# Patient Record
Sex: MALE | Race: WHITE | Employment: PART TIME | URBAN - METROPOLITAN AREA
[De-identification: names, ages, dates, MRNs, and addresses within clinical notes are randomized per-mention and may not be internally consistent; named-entity substitution may affect disease eponyms.]

---

## 2017-04-08 ENCOUNTER — EMPLOYEE WELLNESS (OUTPATIENT)
Dept: OTHER | Age: 27
End: 2017-04-08

## 2017-04-08 LAB
CHOLESTEROL, TOTAL: 147 MG/DL (ref 0–199)
FASTING: YES
GLUCOSE BLD-MCNC: 83 MG/DL (ref 74–109)
HDLC SERPL-MCNC: 49 MG/DL (ref 40–90)
LDL CHOLESTEROL CALCULATED: 79 MG/DL
TRIGL SERPL-MCNC: 97 MG/DL (ref 0–199)

## 2017-07-06 ENCOUNTER — OFFICE VISIT (OUTPATIENT)
Dept: FAMILY MEDICINE CLINIC | Age: 27
End: 2017-07-06

## 2017-07-06 ENCOUNTER — TELEPHONE (OUTPATIENT)
Dept: FAMILY MEDICINE CLINIC | Age: 27
End: 2017-07-06

## 2017-07-06 VITALS
HEIGHT: 72 IN | BODY MASS INDEX: 24.24 KG/M2 | SYSTOLIC BLOOD PRESSURE: 126 MMHG | DIASTOLIC BLOOD PRESSURE: 72 MMHG | HEART RATE: 88 BPM | RESPIRATION RATE: 12 BRPM | WEIGHT: 179 LBS | TEMPERATURE: 98.3 F

## 2017-07-06 DIAGNOSIS — M54.2 NECK PAIN: ICD-10-CM

## 2017-07-06 DIAGNOSIS — A08.4 VIRAL GASTROENTERITIS: Primary | ICD-10-CM

## 2017-07-06 PROCEDURE — 99213 OFFICE O/P EST LOW 20 MIN: CPT | Performed by: FAMILY MEDICINE

## 2017-07-06 RX ORDER — METOPROLOL SUCCINATE 25 MG/1
37.5 TABLET, EXTENDED RELEASE ORAL DAILY
Qty: 135 TABLET | Refills: 4 | Status: SHIPPED | OUTPATIENT
Start: 2017-07-06 | End: 2018-09-27 | Stop reason: SDUPTHER

## 2017-07-06 RX ORDER — TIZANIDINE 2 MG/1
4 TABLET ORAL EVERY 8 HOURS PRN
Qty: 30 TABLET | Refills: 2 | Status: SHIPPED | OUTPATIENT
Start: 2017-07-06

## 2017-07-06 ASSESSMENT — PATIENT HEALTH QUESTIONNAIRE - PHQ9
SUM OF ALL RESPONSES TO PHQ9 QUESTIONS 1 & 2: 0
2. FEELING DOWN, DEPRESSED OR HOPELESS: 0
1. LITTLE INTEREST OR PLEASURE IN DOING THINGS: 0
SUM OF ALL RESPONSES TO PHQ QUESTIONS 1-9: 0

## 2017-11-27 ENCOUNTER — OFFICE VISIT (OUTPATIENT)
Dept: FAMILY MEDICINE CLINIC | Age: 27
End: 2017-11-27
Payer: COMMERCIAL

## 2017-11-27 VITALS
SYSTOLIC BLOOD PRESSURE: 132 MMHG | TEMPERATURE: 98.3 F | RESPIRATION RATE: 12 BRPM | DIASTOLIC BLOOD PRESSURE: 88 MMHG | WEIGHT: 186.2 LBS | BODY MASS INDEX: 25.22 KG/M2 | HEART RATE: 76 BPM | HEIGHT: 72 IN

## 2017-11-27 DIAGNOSIS — Z23 NEED FOR IMMUNIZATION AGAINST INFLUENZA: ICD-10-CM

## 2017-11-27 DIAGNOSIS — M25.562 ACUTE PAIN OF LEFT KNEE: Primary | ICD-10-CM

## 2017-11-27 PROCEDURE — G8419 CALC BMI OUT NRM PARAM NOF/U: HCPCS | Performed by: FAMILY MEDICINE

## 2017-11-27 PROCEDURE — G8484 FLU IMMUNIZE NO ADMIN: HCPCS | Performed by: FAMILY MEDICINE

## 2017-11-27 PROCEDURE — 90471 IMMUNIZATION ADMIN: CPT | Performed by: FAMILY MEDICINE

## 2017-11-27 PROCEDURE — G8427 DOCREV CUR MEDS BY ELIG CLIN: HCPCS | Performed by: FAMILY MEDICINE

## 2017-11-27 PROCEDURE — 90686 IIV4 VACC NO PRSV 0.5 ML IM: CPT | Performed by: FAMILY MEDICINE

## 2017-11-27 PROCEDURE — 1036F TOBACCO NON-USER: CPT | Performed by: FAMILY MEDICINE

## 2017-11-27 PROCEDURE — 99213 OFFICE O/P EST LOW 20 MIN: CPT | Performed by: FAMILY MEDICINE

## 2017-11-27 NOTE — PROGRESS NOTES
Kate Andrews is a 32 y.o. male that presents for Leg Pain (x 1 week, left leg/knee pain, pt describes that pain as a constant achiness with occassional sharpness, pain radiates into left thigh, leg/knee feels stiff and weak)        HPI:    Patient noting more left knee pain for the past week. No known injury. He has a history of osteochondromas. He has a known one on the left femur. Pain is aching in nature. Worse when he is on his feet more. Aching type pain. Can radiate to the ankle. Health Maintenance   Topic Date Due    HIV screen  02/23/2005    DTaP/Tdap/Td vaccine (2 - Td) 12/27/2026    Flu vaccine  Completed       Past Medical History:   Diagnosis Date    Anxiety     Osteochondroma     Palpitations        Past Surgical History:   Procedure Laterality Date    LEG SURGERY Right     osteochondroma    OTHER SURGICAL HISTORY Left 08/11/2016    Excision left Rib Osteochodnroma by Dr Leeanne Romero EXTRACTION         Social History   Substance Use Topics    Smoking status: Never Smoker    Smokeless tobacco: Never Used    Alcohol use No       Family History   Problem Relation Age of Onset    Cancer Father     High Blood Pressure Father     Diabetes Father     High Cholesterol Mother     High Blood Pressure Brother     Cancer Maternal Grandfather     Heart Disease Neg Hx     Stroke Neg Hx          I have reviewed the patient's past medical history, past surgical history, allergies, medications, social and family history and I have made updates where appropriate.     ROS        PHYSICAL EXAM:  /88   Pulse 76   Temp 98.3 °F (36.8 °C) (Oral)   Resp 12   Ht 6' 0.05\" (1.83 m)   Wt 186 lb 3.2 oz (84.5 kg)   BMI 25.22 kg/m²     General Appearance: well developed and well- nourished, in no acute distress  Head: normocephalic and atraumatic  Extremities: no cyanosis, clubbing or edema of the lower extremities  Psych:  Normal affect without evidence of depression or anxiety, insight and judgement are appropriate, memory appears intact  Skin: warm and dry, no rash or erythema    Left Knee Exam    5/5 strength in the LEs bilaterally  No gross deformity, edema or discoloration of the knees bilaterally  There is not medial joint line tenderness  There is not lateral joint line tenderness  Valgus/Varus testing is negative  Lachman's test is negative  Anitha test is negative         ASSESSMENT & PLAN  Aleksandar Garcia was seen today for leg pain. Diagnoses and all orders for this visit:    Acute pain of left knee  -     XR KNEE LEFT (3 VIEWS); Future    Need for immunization against influenza  -     INFLUENZA, QUADV, 3 YRS AND OLDER, IM, PF, PREFILL SYR OR SDV, 0.5ML (FLUZONE QUADV, PF)    -Suspect osteochondroma as a cause of his pain given his history. Will obtain XRay, if confirmed, will refer to Ortho. Return if symptoms worsen or fail to improve. Aleksandar Garcia received counseling on the following healthy behaviors: medication adherence  Reviewed prior labs and health maintenance. Continue current medications, diet and exercise. Discussed use, benefit, and side effects of prescribed medications. Barriers to medication compliance addressed. Patient given educational materials - see patient instructions. All patient questions answered. Patient voiced understanding.

## 2017-11-28 ENCOUNTER — HOSPITAL ENCOUNTER (OUTPATIENT)
Age: 27
Discharge: HOME | End: 2017-11-28
Payer: COMMERCIAL

## 2017-11-28 ENCOUNTER — HOSPITAL ENCOUNTER (OUTPATIENT)
Dept: GENERAL RADIOLOGY | Age: 27
Discharge: HOME | End: 2017-11-28
Payer: COMMERCIAL

## 2017-11-28 ENCOUNTER — TELEPHONE (OUTPATIENT)
Dept: FAMILY MEDICINE CLINIC | Age: 27
End: 2017-11-28

## 2017-11-28 DIAGNOSIS — D16.22: Primary | ICD-10-CM

## 2017-11-28 DIAGNOSIS — M25.562 ACUTE PAIN OF LEFT KNEE: ICD-10-CM

## 2017-11-28 PROCEDURE — 73562 X-RAY EXAM OF KNEE 3: CPT

## 2017-11-28 NOTE — TELEPHONE ENCOUNTER
Pt informed. Debbi Villarreal with MRI. Informed that test will need to be authorized through insurance. Pt verbalized understanding. He will call Dr Flores Tsai and schedule a f/u. Order placed at  to be sent to [facility] for PA and scheduling.

## 2017-12-07 ENCOUNTER — HOSPITAL ENCOUNTER (OUTPATIENT)
Dept: MRI IMAGING | Age: 27
Discharge: HOME | End: 2017-12-07
Payer: COMMERCIAL

## 2017-12-07 DIAGNOSIS — D16.22: ICD-10-CM

## 2017-12-07 PROCEDURE — 73721 MRI JNT OF LWR EXTRE W/O DYE: CPT

## 2017-12-08 ENCOUNTER — TELEPHONE (OUTPATIENT)
Dept: FAMILY MEDICINE CLINIC | Age: 27
End: 2017-12-08

## 2017-12-08 NOTE — TELEPHONE ENCOUNTER
Left detailed msg informing pt of results. (ok per signed HIPAA) Asked pt to return call at earliest convenience.

## 2018-03-20 VITALS — BODY MASS INDEX: 24.28 KG/M2 | WEIGHT: 179 LBS

## 2018-09-27 RX ORDER — METOPROLOL SUCCINATE 25 MG/1
37.5 TABLET, EXTENDED RELEASE ORAL DAILY
Qty: 135 TABLET | Refills: 4 | Status: SHIPPED | OUTPATIENT
Start: 2018-09-27 | End: 2019-08-05 | Stop reason: SDUPTHER

## 2019-08-05 RX ORDER — METOPROLOL SUCCINATE 25 MG/1
37.5 TABLET, EXTENDED RELEASE ORAL DAILY
Qty: 135 TABLET | Refills: 4 | Status: SHIPPED | OUTPATIENT
Start: 2019-08-05